# Patient Record
Sex: FEMALE | Race: WHITE | HISPANIC OR LATINO | Employment: STUDENT
[De-identification: names, ages, dates, MRNs, and addresses within clinical notes are randomized per-mention and may not be internally consistent; named-entity substitution may affect disease eponyms.]

---

## 2022-07-10 ENCOUNTER — HEALTH MAINTENANCE LETTER (OUTPATIENT)
Age: 27
End: 2022-07-10

## 2022-08-02 ENCOUNTER — OFFICE VISIT (OUTPATIENT)
Dept: FAMILY MEDICINE | Facility: CLINIC | Age: 27
End: 2022-08-02
Payer: COMMERCIAL

## 2022-08-02 VITALS
DIASTOLIC BLOOD PRESSURE: 77 MMHG | HEART RATE: 118 BPM | TEMPERATURE: 100.4 F | WEIGHT: 156.2 LBS | HEIGHT: 62 IN | SYSTOLIC BLOOD PRESSURE: 125 MMHG | BODY MASS INDEX: 28.74 KG/M2 | OXYGEN SATURATION: 96 %

## 2022-08-02 DIAGNOSIS — R79.89 ELEVATED LFTS: ICD-10-CM

## 2022-08-02 DIAGNOSIS — R50.9 FEVER AND CHILLS: Primary | ICD-10-CM

## 2022-08-02 DIAGNOSIS — Z78.9 RECENT FOREIGN TRAVEL: ICD-10-CM

## 2022-08-02 DIAGNOSIS — R11.10 VOMITING AND DIARRHEA: ICD-10-CM

## 2022-08-02 DIAGNOSIS — R19.7 VOMITING AND DIARRHEA: ICD-10-CM

## 2022-08-02 LAB
ALBUMIN SERPL BCG-MCNC: 4.9 G/DL (ref 3.5–5.2)
ALP SERPL-CCNC: 76 U/L (ref 35–104)
ALT SERPL W P-5'-P-CCNC: 116 U/L (ref 10–35)
ANION GAP SERPL CALCULATED.3IONS-SCNC: 14 MMOL/L (ref 7–15)
AST SERPL W P-5'-P-CCNC: 61 U/L (ref 10–35)
BASOPHILS # BLD AUTO: 0 10E3/UL (ref 0–0.2)
BASOPHILS NFR BLD AUTO: 0 %
BILIRUB SERPL-MCNC: 0.2 MG/DL
BUN SERPL-MCNC: 7.3 MG/DL (ref 6–20)
CALCIUM SERPL-MCNC: 9.9 MG/DL (ref 8.6–10)
CHLORIDE SERPL-SCNC: 100 MMOL/L (ref 98–107)
CREAT SERPL-MCNC: 0.59 MG/DL (ref 0.51–0.95)
DEPRECATED HCO3 PLAS-SCNC: 25 MMOL/L (ref 22–29)
EOSINOPHIL # BLD AUTO: 0.1 10E3/UL (ref 0–0.7)
EOSINOPHIL NFR BLD AUTO: 1 %
ERYTHROCYTE [DISTWIDTH] IN BLOOD BY AUTOMATED COUNT: 12.5 % (ref 10–15)
GFR SERPL CREATININE-BSD FRML MDRD: >90 ML/MIN/1.73M2
GLUCOSE SERPL-MCNC: 80 MG/DL (ref 70–99)
HCT VFR BLD AUTO: 44 % (ref 35–47)
HGB BLD-MCNC: 14.9 G/DL (ref 11.7–15.7)
IMM GRANULOCYTES # BLD: 0 10E3/UL
IMM GRANULOCYTES NFR BLD: 0 %
LYMPHOCYTES # BLD AUTO: 0.6 10E3/UL (ref 0.8–5.3)
LYMPHOCYTES NFR BLD AUTO: 8 %
MCH RBC QN AUTO: 30.3 PG (ref 26.5–33)
MCHC RBC AUTO-ENTMCNC: 33.9 G/DL (ref 31.5–36.5)
MCV RBC AUTO: 90 FL (ref 78–100)
MONOCYTES # BLD AUTO: 0.6 10E3/UL (ref 0–1.3)
MONOCYTES NFR BLD AUTO: 8 %
NEUTROPHILS # BLD AUTO: 5.9 10E3/UL (ref 1.6–8.3)
NEUTROPHILS NFR BLD AUTO: 82 %
PLATELET # BLD AUTO: 310 10E3/UL (ref 150–450)
POTASSIUM SERPL-SCNC: 4.6 MMOL/L (ref 3.4–5.3)
PROT SERPL-MCNC: 7.3 G/DL (ref 6.4–8.3)
RBC # BLD AUTO: 4.91 10E6/UL (ref 3.8–5.2)
SODIUM SERPL-SCNC: 139 MMOL/L (ref 136–145)
WBC # BLD AUTO: 7.2 10E3/UL (ref 4–11)

## 2022-08-02 PROCEDURE — U0005 INFEC AGEN DETEC AMPLI PROBE: HCPCS | Performed by: FAMILY MEDICINE

## 2022-08-02 PROCEDURE — 80053 COMPREHEN METABOLIC PANEL: CPT | Performed by: FAMILY MEDICINE

## 2022-08-02 PROCEDURE — 85025 COMPLETE CBC W/AUTO DIFF WBC: CPT | Performed by: FAMILY MEDICINE

## 2022-08-02 PROCEDURE — 99203 OFFICE O/P NEW LOW 30 MIN: CPT | Mod: CS | Performed by: FAMILY MEDICINE

## 2022-08-02 PROCEDURE — 80074 ACUTE HEPATITIS PANEL: CPT | Performed by: FAMILY MEDICINE

## 2022-08-02 PROCEDURE — U0003 INFECTIOUS AGENT DETECTION BY NUCLEIC ACID (DNA OR RNA); SEVERE ACUTE RESPIRATORY SYNDROME CORONAVIRUS 2 (SARS-COV-2) (CORONAVIRUS DISEASE [COVID-19]), AMPLIFIED PROBE TECHNIQUE, MAKING USE OF HIGH THROUGHPUT TECHNOLOGIES AS DESCRIBED BY CMS-2020-01-R: HCPCS | Performed by: FAMILY MEDICINE

## 2022-08-02 PROCEDURE — 36415 COLL VENOUS BLD VENIPUNCTURE: CPT | Performed by: FAMILY MEDICINE

## 2022-08-02 PROCEDURE — 87506 IADNA-DNA/RNA PROBE TQ 6-11: CPT | Performed by: FAMILY MEDICINE

## 2022-08-02 ASSESSMENT — PATIENT HEALTH QUESTIONNAIRE - PHQ9
SUM OF ALL RESPONSES TO PHQ QUESTIONS 1-9: 0
10. IF YOU CHECKED OFF ANY PROBLEMS, HOW DIFFICULT HAVE THESE PROBLEMS MADE IT FOR YOU TO DO YOUR WORK, TAKE CARE OF THINGS AT HOME, OR GET ALONG WITH OTHER PEOPLE: NOT DIFFICULT AT ALL
SUM OF ALL RESPONSES TO PHQ QUESTIONS 1-9: 0

## 2022-08-02 ASSESSMENT — ENCOUNTER SYMPTOMS: DIARRHEA: 1

## 2022-08-02 NOTE — PROGRESS NOTES
"  Assessment & Plan     ICD-10-CM    1. Fever and chills  R50.9 CBC with platelets and differential     Comprehensive metabolic panel (BMP + Alb, Alk Phos, ALT, AST, Total. Bili, TP)     Symptomatic; Unknown COVID-19 Virus (Coronavirus) by PCR Nose     CBC with platelets and differential     Comprehensive metabolic panel (BMP + Alb, Alk Phos, ALT, AST, Total. Bili, TP)   2. Vomiting and diarrhea  R11.10 Enteric Bacteria and Virus Panel by [facility] Stool    R19.7 Enteric Bacteria and Virus Panel by [facility] Stool   3. Recent foreign travel  Z78.9        Medical decision making: Patient after recent trip to [place] with having what appears to be your acute gastroenteritis.  Other infection like COVID-19 cannot be ruled out.  At this time her stool is getting more mushy than watery.  Lab testing as above.  Printed educational material provided.  Patient has had vomit x1 this morning and at the winging of her illness.  Discussed about possible Zofran if this persist.  Signs and symptoms of dry hydration discussed.  Warning symptoms to seek acute help discussed.      BMI:   Estimated body mass index is 28.57 kg/m  as calculated from the following:    Height as of this encounter: 1.575 m (5' 2\").    Weight as of this encounter: 70.9 kg (156 lb 3.2 oz).       See Patient Instructions    No follow-ups on file.    Daniella Cespedes MD  [facility]    Greta Mcclellan is a 26 year old accompanied by her N/A, presenting for the following health issues:  Diarrhea (X2 weeks, pt was in [place] last week, started out there with the diarrhea and threw up once. Pt threw up again this morning. Still having diarrhea, has had two negative covid test. Pt mentioned being cold and having chills. Pt has been using tea and peptobismo )      Diarrhea    History of Present Illness       Reason for visit:  Diarrhea, headache, chills  Symptom onset:  1-2 weeks ago  Symptoms include:  Diarrhea, headaches, chills, feel weak " "with no energy  Symptom intensity:  Severe  Symptom progression:  Worsening  Had these symptoms before:  No  What makes it worse:  Not that I ve noticed  What makes it better:  No    She eats 0-1 servings of fruits and vegetables daily.She consumes 3 sweetened beverage(s) daily.She exercises with enough effort to increase her heart rate 9 or less minutes per day.  She exercises with enough effort to increase her heart rate 3 or less days per week.   She is taking medications regularly.    Today's PHQ-9         PHQ-9 Total Score: 0    PHQ-9 Q9 Thoughts of better off dead/self-harm past 2 weeks :   Not at all    How difficult have these problems made it for you to do your work, take care of things at home, or get along with other people: Not difficult at all       .    Review of Systems   Gastrointestinal: Positive for diarrhea.      Constitutional, HEENT, cardiovascular, pulmonary, and gu systems are negative, except as otherwise noted.      Objective    /77 (BP Location: Left arm, Patient Position: Sitting, Cuff Size: Adult Large)   Pulse 118   Temp 100.4  F (38  C) (Oral)   Ht 1.575 m (5' 2\")   Wt 70.9 kg (156 lb 3.2 oz)   SpO2 96%   BMI 28.57 kg/m    Body mass index is 28.57 kg/m .  Physical Exam   GENERAL: alert and over weight  HENT: ear canals and TM's normal, nose and mouth without ulcers or lesions  NECK: no adenopathy, no asymmetry, masses, or scars and thyroid normal to palpation  RESP: lungs clear to auscultation - no rales, rhonchi or wheezes  CV: regular rate and rhythm, normal S1 S2, no S3 or S4, no murmur, click or rub, no peripheral edema and peripheral pulses strong  ABDOMEN: soft, nontender, no hepatosplenomegaly, no masses and bowel sounds normal  MS: no gross musculoskeletal defects noted, no edema  PSYCH: mentation appears normal, affect normal/bright    Results for orders placed or performed in visit on 08/02/22 (from the past 24 hour(s))   CBC with platelets and differential    " Narrative    The following orders were created for panel order CBC with platelets and differential.  Procedure                               Abnormality         Status                     ---------                               -----------         ------                     CBC with platelets and d...[[id number]]  Abnormal            Final result                 Please view results for these tests on the individual orders.   CBC with platelets and differential   Result Value Ref Range    WBC Count 7.2 4.0 - 11.0 10e3/uL    RBC Count 4.91 3.80 - 5.20 10e6/uL    Hemoglobin 14.9 11.7 - 15.7 g/dL    Hematocrit 44.0 35.0 - 47.0 %    MCV 90 78 - 100 fL    MCH 30.3 26.5 - 33.0 pg    MCHC 33.9 31.5 - 36.5 g/dL    RDW 12.5 10.0 - 15.0 %    Platelet Count 310 150 - 450 10e3/uL    % Neutrophils 82 %    % Lymphocytes 8 %    % Monocytes 8 %    % Eosinophils 1 %    % Basophils 0 %    % Immature Granulocytes 0 %    Absolute Neutrophils 5.9 1.6 - 8.3 10e3/uL    Absolute Lymphocytes 0.6 (L) 0.8 - 5.3 10e3/uL    Absolute Monocytes 0.6 0.0 - 1.3 10e3/uL    Absolute Eosinophils 0.1 0.0 - 0.7 10e3/uL    Absolute Basophils 0.0 0.0 - 0.2 10e3/uL    Absolute Immature Granulocytes 0.0 <=0.4 10e3/uL                   .  ..

## 2022-08-03 ENCOUNTER — MYC MEDICAL ADVICE (OUTPATIENT)
Dept: FAMILY MEDICINE | Facility: CLINIC | Age: 27
End: 2022-08-03

## 2022-08-03 DIAGNOSIS — R79.89 ELEVATED LFTS: Primary | ICD-10-CM

## 2022-08-03 LAB — SARS-COV-2 RNA RESP QL NAA+PROBE: NEGATIVE

## 2022-08-04 ENCOUNTER — NURSE TRIAGE (OUTPATIENT)
Dept: NURSING | Facility: CLINIC | Age: 27
End: 2022-08-04
Payer: COMMERCIAL

## 2022-08-04 ENCOUNTER — TELEPHONE (OUTPATIENT)
Dept: FAMILY MEDICINE | Facility: CLINIC | Age: 27
End: 2022-08-04

## 2022-08-04 ENCOUNTER — TELEPHONE (OUTPATIENT)
Dept: NURSING | Facility: CLINIC | Age: 27
End: 2022-08-04

## 2022-08-04 DIAGNOSIS — K52.9 ACUTE GASTROENTERITIS: Primary | ICD-10-CM

## 2022-08-04 DIAGNOSIS — A09 ACUTE INFECTIVE GASTROENTERITIS: ICD-10-CM

## 2022-08-04 LAB
HAV IGM SERPL QL IA: NONREACTIVE
HBV CORE IGM SERPL QL IA: NONREACTIVE
HBV SURFACE AG SERPL QL IA: NONREACTIVE
HCV AB SERPL QL IA: NONREACTIVE

## 2022-08-04 PROCEDURE — 99207 E-CONSULT TO INFECTIOUS DISEASE (ADULT OUTPT PROVIDER TO SPECIALIST WRITTEN QUESTION & RESPONSE): CPT

## 2022-08-04 RX ORDER — AZITHROMYCIN 500 MG/1
TABLET, FILM COATED ORAL
Qty: 9 TABLET | Refills: 0 | Status: SHIPPED | OUTPATIENT
Start: 2022-08-04 | End: 2023-10-12

## 2022-08-04 NOTE — TELEPHONE ENCOUNTER
Patient to take 2 doses today and then 1 additional for the next 7 days to a total of 9 doses      Daniella Cespedes MD

## 2022-08-04 NOTE — TELEPHONE ENCOUNTER
Lab calling with critical value at 0033.    Critical Lab Value: Stool PCR +salmonella and vibrio group  Lab Drawn at: 8/2/22 8425  Ordering Provider: Dr Tomlinson    Paged on call provider at: Dr Concepcion at 0042 left , Dr Arrington, Dr Macias  Provider called back at: 0059      Provider Recommendations: Route critical alert to ordering provider.  Does on call provider want RN to call patient tonight? No  Should message be routed to PCP to follow up of lab result? Yes    Routing note to PCP.    Adali Gabriel RN  08/04/22 1:00 AM  [facility] Nurse Advisor      Reason for Disposition    Lab or radiology calling with CRITICAL test results    Additional Information    Negative: Lab calling with strep throat test results and triager can call in prescription    Negative: Lab calling with urinalysis test results and triager can call in prescription    Negative: Medication questions    Negative: ED call to PCP    Negative: Physician call to PCP    Negative: Call about patient who is currently hospitalized    Protocols used: PCP CALL - NO TRIAGE-A-

## 2022-08-04 NOTE — TELEPHONE ENCOUNTER
Annabel called.    Azithromycin Rx needs clarification.  Qty 9 doesn't match sig.    Routed: High priority to Dr Cespedes.  Alison FONSECA RN [facility]

## 2022-08-04 NOTE — TELEPHONE ENCOUNTER
Provider Response to 2nd Level Triage Request    I have reviewed the RN documentation. My recommendation is:  Patient started on antibiotics     I called and talked with the patient.  She is overall stable.  Discussed her lab results and started her on antibiotics.  With her permission, I have done a ED referral to ID to review of my treatment plan is sufficient as PCR testing is not too informative and just mention Salmonella and vibrio.    I will keep a check on this.    We will repeat liver function test and CBC on Friday.  We will consider blood culture to if patient is febrile.    I was able to curbside consult infectious disease but not with [facility]  Daniella Cespedes MD

## 2022-08-04 NOTE — TELEPHONE ENCOUNTER
Radha Brewer,    I was not on call last night but they could not get ahold of the on call doctor and called me early this morning.     This patient has a critical lab showing Salmonella. I had them forward the message for your review this morning. '

## 2022-08-05 ENCOUNTER — MYC MEDICAL ADVICE (OUTPATIENT)
Dept: FAMILY MEDICINE | Facility: CLINIC | Age: 27
End: 2022-08-05

## 2022-08-05 ENCOUNTER — LAB (OUTPATIENT)
Dept: LAB | Facility: CLINIC | Age: 27
End: 2022-08-05
Payer: COMMERCIAL

## 2022-08-05 ENCOUNTER — ALLIED HEALTH/NURSE VISIT (OUTPATIENT)
Dept: FAMILY MEDICINE | Facility: CLINIC | Age: 27
End: 2022-08-05

## 2022-08-05 VITALS
HEART RATE: 83 BPM | DIASTOLIC BLOOD PRESSURE: 66 MMHG | TEMPERATURE: 97.3 F | SYSTOLIC BLOOD PRESSURE: 98 MMHG | RESPIRATION RATE: 16 BRPM

## 2022-08-05 DIAGNOSIS — R11.10 VOMITING AND DIARRHEA: ICD-10-CM

## 2022-08-05 DIAGNOSIS — K52.9 ACUTE GASTROENTERITIS: ICD-10-CM

## 2022-08-05 DIAGNOSIS — R50.9 FEVER AND CHILLS: ICD-10-CM

## 2022-08-05 DIAGNOSIS — R79.89 ELEVATED LFTS: Primary | ICD-10-CM

## 2022-08-05 DIAGNOSIS — Z01.30 BP CHECK: Primary | ICD-10-CM

## 2022-08-05 DIAGNOSIS — R19.7 VOMITING AND DIARRHEA: ICD-10-CM

## 2022-08-05 LAB
ALBUMIN SERPL BCG-MCNC: 4.6 G/DL (ref 3.5–5.2)
ALP SERPL-CCNC: 61 U/L (ref 35–104)
ALT SERPL W P-5'-P-CCNC: 92 U/L (ref 10–35)
AST SERPL W P-5'-P-CCNC: 50 U/L (ref 10–35)
BASOPHILS # BLD MANUAL: 0 10E3/UL (ref 0–0.2)
BASOPHILS NFR BLD MANUAL: 0 %
BILIRUB DIRECT SERPL-MCNC: <0.2 MG/DL (ref 0–0.3)
BILIRUB SERPL-MCNC: <0.2 MG/DL
EOSINOPHIL # BLD MANUAL: 0 10E3/UL (ref 0–0.7)
EOSINOPHIL NFR BLD MANUAL: 1 %
ERYTHROCYTE [DISTWIDTH] IN BLOOD BY AUTOMATED COUNT: 12.6 % (ref 10–15)
HCT VFR BLD AUTO: 44.2 % (ref 35–47)
HGB BLD-MCNC: 14.3 G/DL (ref 11.7–15.7)
LYMPHOCYTES # BLD MANUAL: 1.9 10E3/UL (ref 0.8–5.3)
LYMPHOCYTES NFR BLD MANUAL: 43 %
MCH RBC QN AUTO: 30.2 PG (ref 26.5–33)
MCHC RBC AUTO-ENTMCNC: 32.4 G/DL (ref 31.5–36.5)
MCV RBC AUTO: 93 FL (ref 78–100)
MONOCYTES # BLD MANUAL: 0.3 10E3/UL (ref 0–1.3)
MONOCYTES NFR BLD MANUAL: 7 %
NEUTROPHILS # BLD MANUAL: 2.2 10E3/UL (ref 1.6–8.3)
NEUTROPHILS NFR BLD MANUAL: 49 %
PLAT MORPH BLD: NORMAL
PLATELET # BLD AUTO: 285 10E3/UL (ref 150–450)
PROT SERPL-MCNC: 6.9 G/DL (ref 6.4–8.3)
RBC # BLD AUTO: 4.73 10E6/UL (ref 3.8–5.2)
RBC MORPH BLD: NORMAL
WBC # BLD AUTO: 4.4 10E3/UL (ref 4–11)

## 2022-08-05 PROCEDURE — 36415 COLL VENOUS BLD VENIPUNCTURE: CPT

## 2022-08-05 PROCEDURE — 80076 HEPATIC FUNCTION PANEL: CPT

## 2022-08-05 PROCEDURE — 99207 PR NO CHARGE NURSE ONLY: CPT

## 2022-08-05 PROCEDURE — 87040 BLOOD CULTURE FOR BACTERIA: CPT

## 2022-08-05 PROCEDURE — 85007 BL SMEAR W/DIFF WBC COUNT: CPT

## 2022-08-05 PROCEDURE — 85027 COMPLETE CBC AUTOMATED: CPT

## 2022-08-05 NOTE — TELEPHONE ENCOUNTER
Left message to call back. Please assist in scheduling lab appointment. Also needs BP check and temp - add to MA schedule also.

## 2022-08-05 NOTE — PROGRESS NOTES
Follow Up Blood Pressure Check    Vy Linton is a 26 year old female recommended to follow up for blood pressure check by Dr. Cespedes . Anihypertensive medications and adherence were verified: No.     Reason for visit: blood pressure and temp     Medication change at last visit: none     Today's Vitals:   Vitals:    08/05/22 1328   BP: 98/66   BP Location: Left arm   Patient Position: Sitting   Cuff Size: Adult Regular   Pulse: 83   Resp: 16   Temp: 97.3  F (36.3  C)   TempSrc: Oral           Lowest blood pressure today is 98/66 and they deny signs or symptoms of new onset: severe headache, fatigue, confusion, vision changes, chest pain, pounding in the chest, neck, ears, irregular heartbeat, difficulty breathing and blood in the urine.  Please inform patient of his/her blood pressure today.  If they are asymptomatic, the patient is to continue current medications.  This message will be routed to their provider, and they will be notified if a change in medication is recommended.        Rayne Celeste    Current Outpatient Medications   Medication Sig Dispense Refill     azithromycin (ZITHROMAX) 500 MG tablet Take 2 tablet at once today and then take 1 tablet daily for the next 5 days 9 tablet 0

## 2022-08-05 NOTE — TELEPHONE ENCOUNTER
Please help schedule patient for a lab only visit today.  Patient was unable to do it via [employer].  I would like to be informed when patient is here as I would like to do temperature check for this patient    Please call patient after scheduling lab visit.    Daniella Cespedes MD

## 2022-08-08 DIAGNOSIS — R79.89 ELEVATED LFTS: Primary | ICD-10-CM

## 2022-08-10 LAB — BACTERIA BLD CULT: NO GROWTH

## 2022-09-10 ENCOUNTER — HEALTH MAINTENANCE LETTER (OUTPATIENT)
Age: 27
End: 2022-09-10

## 2022-11-11 LAB
C COLI+JEJUNI+LARI FUSA STL QL NAA+PROBE: NOT DETECTED
EC STX1 GENE STL QL NAA+PROBE: NOT DETECTED
EC STX2 GENE STL QL NAA+PROBE: NOT DETECTED
NOROV GI+II ORF1-ORF2 JNC STL QL NAA+PR: NOT DETECTED
RVA NSP5 STL QL NAA+PROBE: NOT DETECTED
SALMONELLA SP RPOD STL QL NAA+PROBE: DETECTED
SHIGELLA SP+EIEC IPAH STL QL NAA+PROBE: NOT DETECTED
V CHOL+PARA RFBL+TRKH+TNAA STL QL NAA+PR: DETECTED
Y ENTERO RECN STL QL NAA+PROBE: NOT DETECTED

## 2023-06-14 ENCOUNTER — OFFICE VISIT (OUTPATIENT)
Dept: FAMILY MEDICINE | Facility: CLINIC | Age: 28
End: 2023-06-14
Payer: COMMERCIAL

## 2023-06-14 VITALS
HEIGHT: 63 IN | OXYGEN SATURATION: 97 % | HEART RATE: 119 BPM | WEIGHT: 157.8 LBS | BODY MASS INDEX: 27.96 KG/M2 | RESPIRATION RATE: 14 BRPM | DIASTOLIC BLOOD PRESSURE: 62 MMHG | TEMPERATURE: 99.2 F | SYSTOLIC BLOOD PRESSURE: 110 MMHG

## 2023-06-14 DIAGNOSIS — R35.0 URINARY FREQUENCY: ICD-10-CM

## 2023-06-14 DIAGNOSIS — R50.9 FEVER AND CHILLS: Primary | ICD-10-CM

## 2023-06-14 DIAGNOSIS — R11.2 NAUSEA AND VOMITING, UNSPECIFIED VOMITING TYPE: ICD-10-CM

## 2023-06-14 LAB
ALBUMIN UR-MCNC: ABNORMAL MG/DL
APPEARANCE UR: CLEAR
BACTERIA #/AREA URNS HPF: ABNORMAL /HPF
BILIRUB UR QL STRIP: NEGATIVE
COLOR UR AUTO: YELLOW
DEPRECATED S PYO AG THROAT QL EIA: NEGATIVE
FLUAV RNA SPEC QL NAA+PROBE: NEGATIVE
FLUBV RNA RESP QL NAA+PROBE: NEGATIVE
GLUCOSE UR STRIP-MCNC: NEGATIVE MG/DL
GROUP A STREP BY PCR: NOT DETECTED
HGB UR QL STRIP: ABNORMAL
KETONES UR STRIP-MCNC: NEGATIVE MG/DL
LEUKOCYTE ESTERASE UR QL STRIP: NEGATIVE
MUCOUS THREADS #/AREA URNS LPF: PRESENT /LPF
NITRATE UR QL: NEGATIVE
PH UR STRIP: 6.5 [PH] (ref 5–7)
RBC #/AREA URNS AUTO: ABNORMAL /HPF
RSV RNA SPEC NAA+PROBE: NEGATIVE
SARS-COV-2 RNA RESP QL NAA+PROBE: NEGATIVE
SP GR UR STRIP: >=1.03 (ref 1–1.03)
SQUAMOUS #/AREA URNS AUTO: ABNORMAL /LPF
UROBILINOGEN UR STRIP-ACNC: 0.2 E.U./DL
WBC #/AREA URNS AUTO: ABNORMAL /HPF

## 2023-06-14 PROCEDURE — 87086 URINE CULTURE/COLONY COUNT: CPT | Performed by: PHYSICIAN ASSISTANT

## 2023-06-14 PROCEDURE — 87651 STREP A DNA AMP PROBE: CPT | Performed by: PHYSICIAN ASSISTANT

## 2023-06-14 PROCEDURE — 99213 OFFICE O/P EST LOW 20 MIN: CPT | Performed by: PHYSICIAN ASSISTANT

## 2023-06-14 PROCEDURE — 81001 URINALYSIS AUTO W/SCOPE: CPT | Performed by: PHYSICIAN ASSISTANT

## 2023-06-14 PROCEDURE — 87637 SARSCOV2&INF A&B&RSV AMP PRB: CPT | Performed by: PHYSICIAN ASSISTANT

## 2023-06-14 RX ORDER — ONDANSETRON 4 MG/1
4 TABLET, ORALLY DISINTEGRATING ORAL EVERY 6 HOURS PRN
Qty: 8 TABLET | Refills: 0 | Status: SHIPPED | OUTPATIENT
Start: 2023-06-14 | End: 2023-10-12

## 2023-06-14 ASSESSMENT — ENCOUNTER SYMPTOMS: VOMITING: 1

## 2023-06-14 ASSESSMENT — PAIN SCALES - GENERAL: PAINLEVEL: NO PAIN (0)

## 2023-06-14 NOTE — PROGRESS NOTES
Assessment & Plan   Problem List Items Addressed This Visit    None  Visit Diagnoses     Fever and chills    -  Primary    Relevant Orders    UA Macroscopic with reflex to Microscopic and Culture (Completed)    Symptomatic Influenza A/B, RSV, & SARS-CoV2 PCR (COVID-19) (Completed)    UA Microscopic with Reflex to Culture (Completed)    Urine Culture Aerobic Bacterial (Completed)    Urinary frequency        Relevant Orders    UA Macroscopic with reflex to Microscopic and Culture (Completed)    UA Microscopic with Reflex to Culture (Completed)    Urine Culture Aerobic Bacterial (Completed)    Nausea and vomiting, unspecified vomiting type        Relevant Medications    ondansetron (ZOFRAN ODT) 4 MG ODT tab    Other Relevant Orders    Urine Culture Aerobic Bacterial (Completed)         Impression is likely viral illness including COVID-19. Neg strep, flu, rsv, and COVID-19 PCR. UA equivocal but UC Appears well and non-toxic and I have low suspicion for CNS infection, sepsis, or other worrisome process at this point.  She will push p.o. fluids, use over-the-counter meds for symptoms, ondansetron and follow-up with us in 3-5 days if not improving or urgent care/the ER if symptoms worsen/change at any time.    Complete history and physical exam as below. Elevated temp with normal vital signs  aside from tachycardia which improved by the time I examined her.    DDx and Dx discussed with and explained to the pt to their satisfaction.  All questions were answered at this time. Pt expressed understanding of and agreement with this dx, tx, and plan. No further workup warranted and standard medication warnings given. I have given the patient a list of pertinent indications for re-evaluation. Will go to the Emergency Department if symptoms worsen or new concerning symptoms arise. Patient left in no apparent distress.     Ordering of each unique test  Prescription drug management     BMI:   Estimated body mass index is 28.24  "kg/m  as calculated from the following:    Height as of this encounter: 1.592 m (5' 2.68\").    Weight as of this encounter: 71.6 kg (157 lb 12.8 oz).     See Patient Instructions    SIOBHAN [facility] DUANE Mcclellan is a 27 year old, presenting for the following health issues:  Vomiting        6/14/2023     2:07 PM   Additional Questions   Roomed by feliz garza   Accompanied by no one         6/14/2023     2:07 PM   Patient Reported Additional Medications   Patient reports taking the following new medications none     Vomiting        Body aches, vomiting, muscles feel weak, sore throat when swallowing, HA's, chills, sweats, nausea, fever 102. Ate mac and cheese with lobster on Monday from a restaurant, then started to feel sick. Maybe food poisoning. Took Tylenol, Nyquil    Review of Systems   Gastrointestinal: Positive for vomiting.      Constitutional, HEENT, cardiovascular, pulmonary, gi and gu systems are negative, except as otherwise noted.      Objective    /62   Pulse 119   Temp 99.2  F (37.3  C) (Tympanic)   Resp 14   Ht 1.592 m (5' 2.68\")   Wt 71.6 kg (157 lb 12.8 oz)   LMP 05/18/2023 (Approximate)   SpO2 97%   Breastfeeding No   BMI 28.24 kg/m    Body mass index is 28.24 kg/m .  Physical Exam  Vitals and nursing note reviewed.   Constitutional:       General: She is not in acute distress.     Appearance: She is not ill-appearing or diaphoretic.   HENT:      Head: Normocephalic and atraumatic.      Right Ear: Tympanic membrane, ear canal and external ear normal.      Left Ear: Tympanic membrane, ear canal and external ear normal.      Mouth/Throat:      Mouth: Mucous membranes are moist.      Pharynx: Oropharynx is clear.   Eyes:      Conjunctiva/sclera: Conjunctivae normal.   Cardiovascular:      Rate and Rhythm: Regular rhythm. Tachycardia present.      Heart sounds: Normal heart sounds. No murmur heard.     No friction rub. No gallop.   Pulmonary: "      Effort: Pulmonary effort is normal. No respiratory distress.      Breath sounds: Normal breath sounds. No stridor. No wheezing, rhonchi or rales.   Abdominal:      General: Bowel sounds are normal. There is no distension.      Palpations: Abdomen is soft. There is no mass.      Tenderness: There is no abdominal tenderness. There is no right CVA tenderness, left CVA tenderness, guarding or rebound.      Hernia: No hernia is present.   Musculoskeletal:      Cervical back: Normal range of motion and neck supple. No rigidity.   Lymphadenopathy:      Cervical: No cervical adenopathy.   Skin:     General: Skin is warm and dry.   Neurological:      General: No focal deficit present.      Mental Status: She is alert. Mental status is at baseline.   Psychiatric:         Mood and Affect: Mood normal.         Behavior: Behavior normal.          Results for orders placed or performed in visit on 06/14/23   Symptomatic Influenza A/B, RSV, & SARS-CoV2 PCR (COVID-19) Nose     Status: Normal    Specimen: Nose; Swab   Result Value Ref Range    Influenza A PCR Negative Negative    Influenza B PCR Negative Negative    RSV PCR Negative Negative    SARS CoV2 PCR Negative Negative    Narrative    Testing was performed using the Xpert Xpress CoV2/Flu/RSV Assay on the [employer] Gene[employer]pert Instrument. This test should be ordered for the detection of SARS-CoV-2, influenza, and RSV viruses in individuals who meet clinical and/or epidemiological criteria. Test performance is unknown in asymptomatic patients. This test is for in vitro diagnostic use under the FDA EUA for laboratories certified under CLIA to perform high or moderate complexity testing. This test has not been FDA cleared or approved. A negative result does not rule out the presence of PCR inhibitors in the specimen or target RNA in concentration below the limit of detection for the assay. If only one viral target is positive but coinfection with multiple targets is suspected,  the sample should be re-tested with another FDA cleared, approved, or authorized test, if coinfection would change clinical management. This test was validated by the [facility]. These laboratories are certified under the Clinical Laboratory Improvement Amendments of 1988 (CLIA-88) as qualified to perform high complexity laboratory testing.   UA Macroscopic with reflex to Microscopic and Culture     Status: Abnormal    Specimen: Urine, Clean Catch   Result Value Ref Range    Color Urine Yellow Colorless, Straw, Light Yellow, Yellow    Appearance Urine Clear Clear    Glucose Urine Negative Negative mg/dL    Bilirubin Urine Negative Negative    Ketones Urine Negative Negative mg/dL    Specific Gravity Urine >=1.030 1.003 - 1.035    Blood Urine Small (A) Negative    pH Urine 6.5 5.0 - 7.0    Protein Albumin Urine Trace (A) Negative mg/dL    Urobilinogen Urine 0.2 0.2, 1.0 E.U./dL    Nitrite Urine Negative Negative    Leukocyte Esterase Urine Negative Negative   UA Microscopic with Reflex to Culture     Status: Abnormal   Result Value Ref Range    Bacteria Urine Moderate (A) None Seen /HPF    RBC Urine 5-10 (A) 0-2 /HPF /HPF    WBC Urine 0-5 0-5 /HPF /HPF    Squamous Epithelials Urine Many (A) None Seen /LPF    Mucus Urine Present (A) None Seen /LPF    Narrative    Urine Culture not indicated   Streptococcus A Rapid Screen w/Reflex to PCR - Clinic Collect     Status: Normal    Specimen: Throat; Swab   Result Value Ref Range    Group A Strep antigen Negative Negative   Group A Streptococcus PCR Throat Swab     Status: Normal    Specimen: Throat; Swab   Result Value Ref Range    Group A strep by PCR Not Detected Not Detected    Narrative    The Xpert Xpress Strep A test, performed on the [employer], is a rapid, qualitative in vitro diagnostic test for the detection of Streptococcus pyogenes (Group A ß-hemolytic Streptococcus, Strep A) in throat swab specimens from patients with signs  and symptoms of pharyngitis. The Xpert Xpress Strep A test can be used as an aid in the diagnosis of Group A Streptococcal pharyngitis. The assay is not intended to monitor treatment for Group A Streptococcus infections. The Xpert Xpress Strep A test utilizes an automated real-time polymerase chain reaction (PCR) to detect Streptococcus pyogenes DNA.   Urine Culture Aerobic Bacterial     Status: None    Specimen: Urine, Clean Catch   Result Value Ref Range    Culture No Growth                Answers for HPI/ROS submitted by the patient on 6/14/2023  If you checked off any problems, how difficult have these problems made it for you to do your work, take care of things at home, or get along with other people?: Not difficult at all  PHQ9 TOTAL SCORE: 0

## 2023-06-14 NOTE — PATIENT INSTRUCTIONS
Danna Mcclellan,    Thank you for allowing [facility] to manage your care.    I am unsure of the cause of your symptoms, but your exam is reassuring. We will see what our workup shows.     If you develop worsening/changing symptoms at any time, please call 911 or go to the emergency department for evaluation.    I ordered some lab work. Please go to the laboratory to get your studies.    I sent your prescriptions to your pharmacy.    Drink 8-10 glasses of fluid daily to stay well-hydrated.    For your pain, please use Tylenol 650mg every 6 hours. You may use 400mg of ibuprofen between doses of Tylenol.     Max acetaminophen (Tylenol) 4,000mg/24 hours  Max ibuprofen 3,000mg/24 hours    Please allow 1-2 business days for our office to contact you in regards to your laboratory/radiological studies.  If not done so, I encourage you to login into [employer] ([email or web address] to review your results as well.     If you have any questions or concerns, please feel free to call us at [phone]    Sincerely,    Orlando Garces PA-C    Did you know?      You can schedule a video visit for follow-up appointments as well as future appointments for certain conditions.  Please see the below link.     [email or web address]    If you have not already done so,  I encourage you to sign up for [email or web address]  This will allow you to review your results, securely communicate with a provider, and schedule virtual visits as well.

## 2023-06-16 LAB — BACTERIA UR CULT: NO GROWTH

## 2023-06-18 ENCOUNTER — NURSE TRIAGE (OUTPATIENT)
Dept: NURSING | Facility: CLINIC | Age: 28
End: 2023-06-18
Payer: MEDICAID

## 2023-06-18 ENCOUNTER — OFFICE VISIT (OUTPATIENT)
Dept: URGENT CARE | Facility: URGENT CARE | Age: 28
End: 2023-06-18
Payer: COMMERCIAL

## 2023-06-18 VITALS
DIASTOLIC BLOOD PRESSURE: 84 MMHG | TEMPERATURE: 99.5 F | SYSTOLIC BLOOD PRESSURE: 129 MMHG | OXYGEN SATURATION: 97 % | BODY MASS INDEX: 27.74 KG/M2 | RESPIRATION RATE: 25 BRPM | HEART RATE: 118 BPM | WEIGHT: 155 LBS

## 2023-06-18 DIAGNOSIS — B34.9 VIRAL ILLNESS: Primary | ICD-10-CM

## 2023-06-18 DIAGNOSIS — B30.9 VIRAL CONJUNCTIVITIS OF LEFT EYE: ICD-10-CM

## 2023-06-18 DIAGNOSIS — R05.1 ACUTE COUGH: ICD-10-CM

## 2023-06-18 PROCEDURE — 99213 OFFICE O/P EST LOW 20 MIN: CPT

## 2023-06-18 RX ORDER — BENZONATATE 200 MG/1
200 CAPSULE ORAL 3 TIMES DAILY PRN
Qty: 21 CAPSULE | Refills: 0 | Status: SHIPPED | OUTPATIENT
Start: 2023-06-18 | End: 2023-10-12

## 2023-06-18 NOTE — PATIENT INSTRUCTIONS
Get plenty of rest and drink fluids.  Can use Tylenol as needed for pain.  Maximum dose of Tylenol is 4000mg in a 24 hour period of time.  You can also try warm salt water gargles, hot/warm water or tea with honey and/or lemon and/or Cepacol lozenges or spray for your sore throat.  Take Tessalon as prescribed for the cough.  You can use Mucinex 12 hour tablet for your nasal congestion.  Use artificial tears as needed to the left eye.

## 2023-06-18 NOTE — LETTER
June 18, 2023      Vy Linton  [id number] [place] [id number]        To Whom It May Concern:    Vy Linton  was seen on June 18, 2023.  Please excuse her from work until June 20th due to illness.        Sincerely,        ABBY Montelongo CNP

## 2023-06-18 NOTE — TELEPHONE ENCOUNTER
"Pt states she has been sick for over one week, pt seen 6/14/23 and told everything was fine.  Pt states two days ago feeling fine.  Pt states she has now lost her voice.  Pt states left eye mucous/crusty and white drainage and pt states a very sore throat.  Pt states short of breath with activity.  Fever 101 yesterday, fever > 3 days.  Throat pain \"7\" on 0/10 pain scale.  Pt intends to go to [facility] [place], [place] today.  Mariella Villalobos RN  [facility] Nurse Advisor      Reason for Disposition    Fever present > 3 days (72 hours)    Additional Information    Negative: SEVERE difficulty breathing (e.g., struggling for each breath, speaks in single words, stridor)    Negative: Sounds like a life-threatening emergency to the triager    Negative: [1] Diagnosed strep throat AND [2] taking antibiotic AND [3] symptoms continue    Negative: Throat culture results, call about    Negative: Productive cough is main symptom    Negative: Non-productive cough is main symptom    Negative: Hoarseness is main symptom    Negative: Runny nose is main symptom    Negative: Uvula swelling is main symptom    Negative: [1] Drooling or spitting out saliva (because can't swallow) AND [2] normal breathing    Negative: Unable to open mouth completely    Negative: [1] Difficulty breathing AND [2] not severe    Negative: Fever > 104 F (40 C)    Negative: [1] Refuses to drink anything AND [2] for > 12 hours    Negative: [1] Drinking very little AND [2] dehydration suspected (e.g., no urine > 12 hours, very dry mouth, very lightheaded)    Negative: Patient sounds very sick or weak to the triager    Negative: SEVERE (e.g., excruciating) throat pain    Negative: [1] Pus on tonsils (back of throat) AND [2]  fever AND [3] swollen neck lymph nodes (\"glands\")    Negative: [1] Rash AND [2] widespread (especially chest and abdomen)    Negative: Earache also present    Protocols used: SORE THROAT-A-AH      "

## 2023-06-18 NOTE — PROGRESS NOTES
ASSESSMENT:  (B34.9) Viral illness  (primary encounter diagnosis)    (B30.9) Viral conjunctivitis of left eye    (R05.1) Acute cough  Plan: benzonatate (TESSALON) 200 MG capsule    PLAN:  Informed the patient to get plenty of rest, drink fluids and use Tylenol as needed for pain with the maximum dose of Tylenol being 4000mg in a 24 hour period of time.  We also discussed trying warm salt water gargles, hot/warm water or tea with honey and/or lemon and/or Cepacol lozenges or spray for your sore throat.  Instructed her to take Tessalon as prescribed for the cough.  We discussed using Mucinex 12 hour tablet for her nasal congestion.  Viral conjunctivitis patient instructions discussed and provided.  Informed her to use artificial tears as needed to the left eye.  Discussed the need to return to clinic with any new or worsening symptoms.  Patient acknowledged her understanding of the above plan.    The use of [employer] dictation services may have been used to construct the content in this note; any grammatical or spelling errors are non-intentional. Please contact the author of this note directly if you are in need of any clarification.      Alex Redman, ABBY CNP      SUBJECTIVE:   Vy Linton is a 27 year old female presenting with a chief complaint of runny nose, stuffy nose, cough - non-productive, sore throat and left eye redness and tearing.  Left eye redness and tearing started this AM and the other symptoms have been ongoing for a week.  Patient had negative strep/flu and COVID tests.  Treatment measures tried include Tylenol and OTC Cough med.  Predisposing factors include None.    ROS:  Negative except noted above.    OBJECTIVE:  /84 (BP Location: Right arm, Patient Position: Sitting, Cuff Size: Adult Regular)   Pulse 118   Temp 99.5  F (37.5  C) (Tympanic)   Resp 25   Wt 70.3 kg (155 lb)   LMP 05/18/2023 (Approximate)   SpO2 97%   BMI 27.74 kg/m    GENERAL APPEARANCE:  healthy, alert and no distress  EYES: PERRL and conjunctiva/corneas- conjunctival injection OS  HENT: ear canals and TM's normal.  Nose and mouth without ulcers, erythema or lesions  NECK: supple, nontender, no lymphadenopathy  RESP: lungs clear to auscultation - no rales, rhonchi or wheezes  CV: regular rates and rhythm, normal S1 S2, no murmur noted  SKIN: no suspicious lesions or rashes

## 2023-07-05 ENCOUNTER — ANCILLARY PROCEDURE (OUTPATIENT)
Dept: GENERAL RADIOLOGY | Facility: CLINIC | Age: 28
End: 2023-07-05
Attending: PHYSICIAN ASSISTANT
Payer: MEDICAID

## 2023-07-05 ENCOUNTER — OFFICE VISIT (OUTPATIENT)
Dept: FAMILY MEDICINE | Facility: CLINIC | Age: 28
End: 2023-07-05
Payer: MEDICAID

## 2023-07-05 VITALS
OXYGEN SATURATION: 94 % | HEIGHT: 63 IN | DIASTOLIC BLOOD PRESSURE: 86 MMHG | HEART RATE: 108 BPM | SYSTOLIC BLOOD PRESSURE: 132 MMHG | BODY MASS INDEX: 27.64 KG/M2 | RESPIRATION RATE: 16 BRPM | TEMPERATURE: 99.2 F | WEIGHT: 156 LBS

## 2023-07-05 DIAGNOSIS — J32.1 FRONTAL SINUSITIS, UNSPECIFIED CHRONICITY: ICD-10-CM

## 2023-07-05 DIAGNOSIS — R11.2 NAUSEA AND VOMITING, UNSPECIFIED VOMITING TYPE: ICD-10-CM

## 2023-07-05 DIAGNOSIS — J02.9 SORE THROAT: ICD-10-CM

## 2023-07-05 DIAGNOSIS — R05.2 SUBACUTE COUGH: Primary | ICD-10-CM

## 2023-07-05 DIAGNOSIS — R79.89 ELEVATED LFTS: ICD-10-CM

## 2023-07-05 DIAGNOSIS — R05.2 SUBACUTE COUGH: ICD-10-CM

## 2023-07-05 DIAGNOSIS — H10.9 CONJUNCTIVITIS OF BOTH EYES, UNSPECIFIED CONJUNCTIVITIS TYPE: ICD-10-CM

## 2023-07-05 LAB
ALBUMIN SERPL BCG-MCNC: 4.5 G/DL (ref 3.5–5.2)
ALP SERPL-CCNC: 72 U/L (ref 35–104)
ALT SERPL W P-5'-P-CCNC: 21 U/L (ref 0–50)
ANION GAP SERPL CALCULATED.3IONS-SCNC: 11 MMOL/L (ref 7–15)
AST SERPL W P-5'-P-CCNC: 25 U/L (ref 0–45)
BASOPHILS # BLD AUTO: 0 10E3/UL (ref 0–0.2)
BASOPHILS NFR BLD AUTO: 0 %
BILIRUB DIRECT SERPL-MCNC: <0.2 MG/DL (ref 0–0.3)
BILIRUB SERPL-MCNC: 0.3 MG/DL
BUN SERPL-MCNC: 9.8 MG/DL (ref 6–20)
CALCIUM SERPL-MCNC: 9.6 MG/DL (ref 8.6–10)
CHLORIDE SERPL-SCNC: 103 MMOL/L (ref 98–107)
CREAT SERPL-MCNC: 0.54 MG/DL (ref 0.51–0.95)
DEPRECATED HCO3 PLAS-SCNC: 23 MMOL/L (ref 22–29)
DEPRECATED S PYO AG THROAT QL EIA: NEGATIVE
EOSINOPHIL # BLD AUTO: 0.2 10E3/UL (ref 0–0.7)
EOSINOPHIL NFR BLD AUTO: 2 %
ERYTHROCYTE [DISTWIDTH] IN BLOOD BY AUTOMATED COUNT: 12.5 % (ref 10–15)
GFR SERPL CREATININE-BSD FRML MDRD: >90 ML/MIN/1.73M2
GLUCOSE SERPL-MCNC: 95 MG/DL (ref 70–99)
GROUP A STREP BY PCR: NOT DETECTED
HCT VFR BLD AUTO: 41.3 % (ref 35–47)
HGB BLD-MCNC: 13.9 G/DL (ref 11.7–15.7)
IMM GRANULOCYTES # BLD: 0 10E3/UL
IMM GRANULOCYTES NFR BLD: 0 %
LYMPHOCYTES # BLD AUTO: 1.5 10E3/UL (ref 0.8–5.3)
LYMPHOCYTES NFR BLD AUTO: 15 %
MCH RBC QN AUTO: 30.3 PG (ref 26.5–33)
MCHC RBC AUTO-ENTMCNC: 33.7 G/DL (ref 31.5–36.5)
MCV RBC AUTO: 90 FL (ref 78–100)
MONOCYTES # BLD AUTO: 0.7 10E3/UL (ref 0–1.3)
MONOCYTES NFR BLD AUTO: 7 %
MONOCYTES NFR BLD AUTO: NEGATIVE %
NEUTROPHILS # BLD AUTO: 7.6 10E3/UL (ref 1.6–8.3)
NEUTROPHILS NFR BLD AUTO: 76 %
PLATELET # BLD AUTO: 349 10E3/UL (ref 150–450)
POTASSIUM SERPL-SCNC: 4.3 MMOL/L (ref 3.4–5.3)
PROT SERPL-MCNC: 7.3 G/DL (ref 6.4–8.3)
RBC # BLD AUTO: 4.59 10E6/UL (ref 3.8–5.2)
SODIUM SERPL-SCNC: 137 MMOL/L (ref 136–145)
WBC # BLD AUTO: 10.1 10E3/UL (ref 4–11)

## 2023-07-05 PROCEDURE — 85025 COMPLETE CBC W/AUTO DIFF WBC: CPT | Performed by: PHYSICIAN ASSISTANT

## 2023-07-05 PROCEDURE — 99214 OFFICE O/P EST MOD 30 MIN: CPT | Performed by: PHYSICIAN ASSISTANT

## 2023-07-05 PROCEDURE — 36415 COLL VENOUS BLD VENIPUNCTURE: CPT | Performed by: PHYSICIAN ASSISTANT

## 2023-07-05 PROCEDURE — 80053 COMPREHEN METABOLIC PANEL: CPT | Performed by: PHYSICIAN ASSISTANT

## 2023-07-05 PROCEDURE — 87651 STREP A DNA AMP PROBE: CPT | Performed by: PHYSICIAN ASSISTANT

## 2023-07-05 PROCEDURE — 86308 HETEROPHILE ANTIBODY SCREEN: CPT | Performed by: PHYSICIAN ASSISTANT

## 2023-07-05 PROCEDURE — 71046 X-RAY EXAM CHEST 2 VIEWS: CPT | Mod: TC | Performed by: RADIOLOGY

## 2023-07-05 PROCEDURE — 82248 BILIRUBIN DIRECT: CPT | Performed by: PHYSICIAN ASSISTANT

## 2023-07-05 RX ORDER — GUAIFENESIN AND DEXTROMETHORPHAN HYDROBROMIDE 600; 30 MG/1; MG/1
1 TABLET, EXTENDED RELEASE ORAL EVERY 12 HOURS
COMMUNITY
End: 2023-10-12

## 2023-07-05 RX ORDER — DOXYCYCLINE 100 MG/1
100 CAPSULE ORAL 2 TIMES DAILY
Qty: 14 CAPSULE | Refills: 0 | Status: SHIPPED | OUTPATIENT
Start: 2023-07-05 | End: 2023-07-12

## 2023-07-05 RX ORDER — ERYTHROMYCIN 5 MG/G
0.5 OINTMENT OPHTHALMIC 4 TIMES DAILY
Qty: 10 G | Refills: 0 | Status: SHIPPED | OUTPATIENT
Start: 2023-07-05 | End: 2023-07-10

## 2023-07-05 ASSESSMENT — PAIN SCALES - GENERAL: PAINLEVEL: NO PAIN (0)

## 2023-07-05 ASSESSMENT — ENCOUNTER SYMPTOMS
SORE THROAT: 1
HEADACHES: 1
FATIGUE: 1
COUGH: 1

## 2023-07-05 NOTE — PROGRESS NOTES
Assessment & Plan     (R05.2) Subacute cough  (primary encounter diagnosis)  Comment: Patient presents with subacute cough that is been going on for the last 2 to 4 weeks.  Symptoms have not been improving.  Lungs are clear to auscultation.  Oxygen saturation 94%.  Chest x-ray without evidence of infiltrates or obvious pneumonia.  Discussed with patient covering with doxycycline given longevity of symptoms and presentation here with intermittent fevers.  Patient denies possibility of pregnancy.  Plan: CBC with platelets and differential, XR Chest 2        Views, doxycycline hyclate (VIBRAMYCIN) 100 MG         capsule          (J02.9) Sore throat  Comment: Ongoing sore throat.  Strep test is negative.  Had prior negative COVID, RSV, strep strep PCR.  Discussed with patient mono testing.  Plan: Streptococcus A Rapid Screen w/Reflex to PCR -         Clinic Collect, Group A Streptococcus PCR         Throat Swab, Mononucleosis screen          (J32.1) Frontal sinusitis, unspecified chronicity  Comment: He does have tenderness over the frontal sinuses.  Will cover with doxycycline.  Plan: doxycycline hyclate (VIBRAMYCIN) 100 MG capsule          (H10.9) Conjunctivitis of both eyes, unspecified conjunctivitis type  Comment: Conjunctivitis bilateral eyes.  Discussed with patient potential for allergic versus bacterial versus viral.  Given the constellation of symptoms discussed at length more concerning for a viral pathology, however, given longevity of symptoms will cover with antibiotics.  Plan: erythromycin (ROMYCIN) 5 MG/GM ophthalmic         ointment          (R11.2) Nausea and vomiting, unspecified vomiting type  Comment: Intermittent episodes of nausea and vomiting.  Suspect fever related.  Discussed trial of Tylenol as well as ibuprofen for fever control.  Continue to monitor symptoms.  If no improvement in the next 3 to 5 days be seen again.  Plan: Comprehensive metabolic panel (BMP + Alb, Alk         Phos, ALT,  AST, Total. Bili, TP), CBC with         platelets and differential          (R79.89) Elevated LFTs  Comment: History of elevated LFTs.  Plan: Bilirubin direct             ELVIRA Velez [facility] ZENOBIA Mcclellan is a 27 year old, presenting for the following health issues:  Cough, Headache, Fatigue, and Pharyngitis        6/14/2023     2:07 PM   Additional Questions   Roomed by sv, cma   Accompanied by no one     Cough  Associated symptoms include headaches and sore throat.   Headache     Fatigue  Associated symptoms include coughing, fatigue, headaches and a sore throat.   Pharyngitis   Associated symptoms include headaches and cough.   History of Present Illness       Reason for visit:  Virus jaki had for 3 weeks    She eats 0-1 servings of fruits and vegetables daily.She consumes 3 sweetened beverage(s) daily.She exercises with enough effort to increase her heart rate 9 or less minutes per day.  She exercises with enough effort to increase her heart rate 3 or less days per week.   She is taking medications regularly.     Saturday June 10th patient developed URI symptoms with sore throat, fever, chills, cough, headache.  Has taken a total of 4 home COVID test all of which were negative.  At times has been nauseated.  Intermittent bouts of vomiting.  Was seen at urgent care and had urinalysis as well as influenza, RSV, COVID test which was negative.  Intermittent episodes of conjunctivitis with this as well.  Did have exposure to others with similar symptoms, however, they have since improved.  Patient continues to have cough and nasal congestion.  Intermittent sore throat.  Has been feeling feverish.  Notes last year after a trip to [place] had developed Salmonella and had diarrhea feeling unwell at that time.  Was noted to have elevated liver enzymes but never followed up on that.  Patient is concerned that this may be exacerbating her symptoms.  Patient is adamant there is  "no possibility of pregnancy.          Review of Systems   Constitutional: Positive for fatigue.   HENT: Positive for sore throat.    Respiratory: Positive for cough.    Neurological: Positive for headaches.            Objective    /86   Pulse 108   Temp 99.2  F (37.3  C) (Tympanic)   Resp 16   Ht 1.6 m (5' 3\")   Wt 70.8 kg (156 lb)   LMP 05/18/2023 (Approximate)   SpO2 94%   BMI 27.63 kg/m    Body mass index is 27.63 kg/m .  Physical Exam   GENERAL: healthy, alert and no distress  EYES: PERRL, EOMI and bilateral conjunctival injection  HENT: normal cephalic/atraumatic, ear canals and TM's normal, rhinorrhea clear, oral mucous membranes moist and tonsillar erythema.  Tenderness over the frontal sinuses  NECK: no adenopathy, no asymmetry, masses, or scars and thyroid normal to palpation  RESP: lungs clear to auscultation - no rales, rhonchi or wheezes  CV: tachycardia, normal S1 S2, no S3 or S4, no murmur, click or rub, peripheral pulses strong and no peripheral edema  ABDOMEN: soft, nontender, no hepatosplenomegaly, no masses and bowel sounds normal  MS: no gross musculoskeletal defects noted, no edema    Results for orders placed or performed in visit on 07/05/23   XR Chest 2 Views     Status: None    Narrative    CHEST TWO VIEWS  7/5/2023 7:56 AM     HISTORY: 27-year-old woman with subacute cough.    COMPARISON: None       Impression    IMPRESSION: Heart size is normal. No pleural effusion, pneumothorax,  or abnormal area of consolidation.    ROBBIE DURANT MD         SYSTEM ID:  [id number]   Results for orders placed or performed in visit on 07/05/23   Mononucleosis screen     Status: Normal   Result Value Ref Range    Mononucleosis Screen Negative Negative   CBC with platelets and differential     Status: None   Result Value Ref Range    WBC Count 10.1 4.0 - 11.0 10e3/uL    RBC Count 4.59 3.80 - 5.20 10e6/uL    Hemoglobin 13.9 11.7 - 15.7 g/dL    Hematocrit 41.3 35.0 - 47.0 %    MCV 90 78 - 100 fL    " MCH 30.3 26.5 - 33.0 pg    MCHC 33.7 31.5 - 36.5 g/dL    RDW 12.5 10.0 - 15.0 %    Platelet Count 349 150 - 450 10e3/uL    % Neutrophils 76 %    % Lymphocytes 15 %    % Monocytes 7 %    % Eosinophils 2 %    % Basophils 0 %    % Immature Granulocytes 0 %    Absolute Neutrophils 7.6 1.6 - 8.3 10e3/uL    Absolute Lymphocytes 1.5 0.8 - 5.3 10e3/uL    Absolute Monocytes 0.7 0.0 - 1.3 10e3/uL    Absolute Eosinophils 0.2 0.0 - 0.7 10e3/uL    Absolute Basophils 0.0 0.0 - 0.2 10e3/uL    Absolute Immature Granulocytes 0.0 <=0.4 10e3/uL   Streptococcus A Rapid Screen w/Reflex to PCR - Clinic Collect     Status: Normal    Specimen: Throat; Swab   Result Value Ref Range    Group A Strep antigen Negative Negative   CBC with platelets and differential     Status: None    Narrative    The following orders were created for panel order CBC with platelets and differential.  Procedure                               Abnormality         Status                     ---------                               -----------         ------                     CBC with platelets and d...[[id number]]                      Final result                 Please view results for these tests on the individual orders.

## 2023-07-23 ENCOUNTER — HEALTH MAINTENANCE LETTER (OUTPATIENT)
Age: 28
End: 2023-07-23

## 2023-10-12 ENCOUNTER — OFFICE VISIT (OUTPATIENT)
Dept: FAMILY MEDICINE | Facility: CLINIC | Age: 28
End: 2023-10-12
Payer: COMMERCIAL

## 2023-10-12 VITALS
SYSTOLIC BLOOD PRESSURE: 110 MMHG | HEART RATE: 89 BPM | HEIGHT: 63 IN | RESPIRATION RATE: 20 BRPM | TEMPERATURE: 97.5 F | BODY MASS INDEX: 28.39 KG/M2 | WEIGHT: 160.2 LBS | OXYGEN SATURATION: 97 % | DIASTOLIC BLOOD PRESSURE: 72 MMHG

## 2023-10-12 DIAGNOSIS — L03.211 CELLULITIS OF FACE: Primary | ICD-10-CM

## 2023-10-12 PROCEDURE — 99213 OFFICE O/P EST LOW 20 MIN: CPT | Performed by: PHYSICIAN ASSISTANT

## 2023-10-12 RX ORDER — CEPHALEXIN 500 MG/1
500 CAPSULE ORAL 4 TIMES DAILY
Qty: 20 CAPSULE | Refills: 0 | Status: SHIPPED | OUTPATIENT
Start: 2023-10-12 | End: 2023-10-17

## 2023-10-12 ASSESSMENT — ENCOUNTER SYMPTOMS
CHILLS: 0
HEADACHES: 0
FEVER: 0
SORE THROAT: 0
NERVOUS/ANXIOUS: 0
COUGH: 0
FACIAL SWELLING: 0
RHINORRHEA: 0

## 2023-10-12 ASSESSMENT — PAIN SCALES - GENERAL: PAINLEVEL: MODERATE PAIN (5)

## 2023-10-12 NOTE — PATIENT INSTRUCTIONS
Your exam findings are concerning for a mild soft tissue/skin infection called cellulitis.  The firm lump behind the ear is a lymph node that is swollen from the infection.  For treatment you have been prescribed antibiotics, Keflex.  Please take as prescribed.  For discomfort you can use Tylenol/ibuprofen.  You should notice improvement in symptoms within 24-48 hours.  Follow-up in clinic or urgent care for any new or worsening symptoms.

## 2023-10-12 NOTE — PROGRESS NOTES
Assessment & Plan     Cellulitis of face  Patient is a 27-year-old female who presents to clinic due to recent pimple within right ear canal and lump behind the right ear which is painful.  Vital signs normal.  Exam findings are consistent with a mild cellulitis of distal canal and external ear with posterior auricular lymphadenopathy.  Patient prescribed Keflex for treatment.  Return/follow-up precautions provided.  - cephALEXin (KEFLEX) 500 MG capsule; Take 1 capsule (500 mg) by mouth 4 times daily for 5 days      See Patient Instructions    Lindsay Le PA-C  [facility] DUANE Mcclellan is a 27 year old, presenting for the following health issues:  Mass          10/12/2023     8:04 AM   Additional Questions   Roomed by Sparkle JORDAN MA   Accompanied by No one         10/12/2023     8:04 AM   Patient Reported Additional Medications   Patient reports taking the following new medications None       History of Present Illness       Reason for visit:  Found a lump behind my right side ear    She eats 0-1 servings of fruits and vegetables daily.She consumes 2 sweetened beverage(s) daily.She exercises with enough effort to increase her heart rate 9 or less minutes per day.  She exercises with enough effort to increase her heart rate 3 or less days per week.   She is taking medications regularly.       Patient stated that just yesterday she noticed a bump behind her ear that throughout the day become painful. She couldn't lay on that side or when someone hugged her it hurt if it was touched. She also stated that she had a pimple that she popped 2 days ago in the ear canal that had a lot of blood and puss and she is not sure if that is related. Patient stated that her ear is still swollen form that but the lump in located behind the ear not in or on the ear so she is not sure if they are related.       Review of Systems   Constitutional:  Negative for chills and fever.   HENT:  Negative for  "congestion, ear discharge, ear pain, facial swelling, rhinorrhea and sore throat.    Respiratory:  Negative for cough.    Skin:  Negative for rash.   Neurological:  Negative for headaches.   Psychiatric/Behavioral:  The patient is not nervous/anxious.             Objective    /72   Pulse 89   Temp 97.5  F (36.4  C) (Temporal)   Resp 20   Ht 1.59 m (5' 2.6\")   Wt 72.7 kg (160 lb 3.2 oz)   LMP 09/20/2023 (Exact Date)   SpO2 97%   Breastfeeding No   BMI 28.74 kg/m    Body mass index is 28.74 kg/m .  Physical Exam  Vitals and nursing note reviewed.   Constitutional:       General: She is not in acute distress.     Appearance: Normal appearance.   HENT:      Head: Normocephalic and atraumatic.      Right Ear: Tympanic membrane and ear canal normal.      Left Ear: Tympanic membrane, ear canal and external ear normal.      Ears:      Comments: Distal aspect of right inferior canal with 3 comedones and surrounding erythema with mild swelling.  No fluctuance, discharge, pustules.  Erythema and swelling extending to antitragus which is tender to palpation.  Eyes:      Extraocular Movements: Extraocular movements intact.      Pupils: Pupils are equal, round, and reactive to light.   Cardiovascular:      Rate and Rhythm: Normal rate and regular rhythm.      Heart sounds: Normal heart sounds.   Pulmonary:      Effort: Pulmonary effort is normal.      Breath sounds: Normal breath sounds. No wheezing.   Musculoskeletal:         General: Normal range of motion.      Cervical back: Normal range of motion. No rigidity.   Lymphadenopathy:      Cervical: Cervical adenopathy (right posterior aricular) present.   Skin:     General: Skin is warm and dry.   Neurological:      General: No focal deficit present.      Mental Status: She is alert.   Psychiatric:         Mood and Affect: Mood normal.         Behavior: Behavior normal.                              "

## 2023-10-21 ENCOUNTER — OFFICE VISIT (OUTPATIENT)
Dept: URGENT CARE | Facility: URGENT CARE | Age: 28
End: 2023-10-21
Payer: COMMERCIAL

## 2023-10-21 VITALS
HEIGHT: 63 IN | TEMPERATURE: 98.8 F | HEART RATE: 69 BPM | OXYGEN SATURATION: 96 % | SYSTOLIC BLOOD PRESSURE: 108 MMHG | BODY MASS INDEX: 28.53 KG/M2 | WEIGHT: 161 LBS | DIASTOLIC BLOOD PRESSURE: 73 MMHG | RESPIRATION RATE: 14 BRPM

## 2023-10-21 DIAGNOSIS — H60.01 ABSCESS, EAR CANAL, RIGHT: Primary | ICD-10-CM

## 2023-10-21 PROCEDURE — 10060 I&D ABSCESS SIMPLE/SINGLE: CPT | Performed by: INTERNAL MEDICINE

## 2023-10-21 RX ORDER — MUPIROCIN 20 MG/G
OINTMENT TOPICAL 3 TIMES DAILY
Qty: 30 G | Refills: 0 | Status: SHIPPED | OUTPATIENT
Start: 2023-10-21

## 2023-10-21 RX ORDER — SULFAMETHOXAZOLE/TRIMETHOPRIM 800-160 MG
1 TABLET ORAL 2 TIMES DAILY
Qty: 10 TABLET | Refills: 0 | Status: SHIPPED | OUTPATIENT
Start: 2023-10-21 | End: 2023-10-26

## 2023-10-21 NOTE — PROGRESS NOTES
"ASSESSMENT AND PLAN:      ICD-10-CM    1. Abscess, ear canal, right  H60.01 mupirocin (BACTROBAN) 2 % external ointment     DRAIN SKIN ABSCESS SIMPLE/SINGLE     sulfamethoxazole-trimethoprim (BACTRIM DS) 800-160 MG tablet        Patient Instructions     Warm moist heat to area 3 x day  Topical antibiotics 3 x day  Oral antibiotics 2 x day for 5 days    Lin Geiger MD  [facility]    Subjective     Vy Linton is a 27 year old who presents for Patient presents with:  Urgent Care: Present for right inner ear cyst with pus/blood drainage.    an established patient of [facility].        Onset of symptoms was 2 week(s) ago.  Course of illness is waxing and waning.      Location: left ear canal pimple, fiance has popped it   Having blood & pus drainage still  Developed swelling LN  Seen by clinic 10/12  Treatment keflex - this helped LN swelling    Has had black heads in ear before    Review of Systems        Objective    /73   Pulse 69   Temp 98.8  F (37.1  C) (Tympanic)   Resp 14   Ht 1.588 m (5' 2.5\")   Wt 73 kg (161 lb)   LMP 09/20/2023 (Exact Date)   SpO2 96%   BMI 28.98 kg/m    Physical Exam  Vitals reviewed.   Constitutional:       Appearance: Normal appearance.   HENT:      Ears:      Comments: right ear canal entrance - pea sized cyst    Betadine cleansed  Ethlyl chloride anesthetic  11 blade incision  White pus with white formed cyst material    Neurological:      Mental Status: She is alert.                  "

## 2023-10-21 NOTE — PATIENT INSTRUCTIONS
Warm moist heat to area 3 x day  Topical antibiotics 3 x day  Oral antibiotics 2 x day for 5 days

## 2024-09-15 ENCOUNTER — HEALTH MAINTENANCE LETTER (OUTPATIENT)
Age: 29
End: 2024-09-15